# Patient Record
Sex: MALE | Race: OTHER | HISPANIC OR LATINO | URBAN - METROPOLITAN AREA
[De-identification: names, ages, dates, MRNs, and addresses within clinical notes are randomized per-mention and may not be internally consistent; named-entity substitution may affect disease eponyms.]

---

## 2020-03-27 ENCOUNTER — EMERGENCY (EMERGENCY)
Facility: HOSPITAL | Age: 55
LOS: 1 days | Discharge: HOME | End: 2020-03-27
Attending: EMERGENCY MEDICINE
Payer: MEDICAID

## 2020-03-27 VITALS
TEMPERATURE: 102 F | OXYGEN SATURATION: 96 % | HEIGHT: 63 IN | RESPIRATION RATE: 19 BRPM | HEART RATE: 106 BPM | SYSTOLIC BLOOD PRESSURE: 129 MMHG | WEIGHT: 147.05 LBS | DIASTOLIC BLOOD PRESSURE: 75 MMHG

## 2020-03-27 VITALS
RESPIRATION RATE: 15 BRPM | TEMPERATURE: 98 F | OXYGEN SATURATION: 100 % | SYSTOLIC BLOOD PRESSURE: 110 MMHG | DIASTOLIC BLOOD PRESSURE: 68 MMHG | HEART RATE: 88 BPM

## 2020-03-27 PROCEDURE — 87635 SARS-COV-2 COVID-19 AMP PRB: CPT

## 2020-03-27 PROCEDURE — 99283 EMERGENCY DEPT VISIT LOW MDM: CPT

## 2020-03-27 PROCEDURE — 93005 ELECTROCARDIOGRAM TRACING: CPT

## 2020-03-27 PROCEDURE — 94640 AIRWAY INHALATION TREATMENT: CPT

## 2020-03-27 RX ORDER — ACETAMINOPHEN 500 MG
975 TABLET ORAL ONCE
Refills: 0 | Status: COMPLETED | OUTPATIENT
Start: 2020-03-27 | End: 2020-03-27

## 2020-03-27 RX ORDER — MECLIZINE HCL 12.5 MG
25 TABLET ORAL ONCE
Refills: 0 | Status: COMPLETED | OUTPATIENT
Start: 2020-03-27 | End: 2020-03-27

## 2020-03-27 RX ORDER — MECLIZINE HCL 12.5 MG
1 TABLET ORAL
Qty: 20 | Refills: 0
Start: 2020-03-27

## 2020-03-27 RX ORDER — ALBUTEROL 90 UG/1
4 AEROSOL, METERED ORAL ONCE
Refills: 0 | Status: COMPLETED | OUTPATIENT
Start: 2020-03-27 | End: 2020-03-27

## 2020-03-27 RX ADMIN — ALBUTEROL 4 PUFF(S): 90 AEROSOL, METERED ORAL at 22:13

## 2020-03-27 RX ADMIN — Medication 975 MILLIGRAM(S): at 22:13

## 2020-03-27 RX ADMIN — Medication 25 MILLIGRAM(S): at 22:21

## 2020-03-27 NOTE — ED PROVIDER NOTE - PHYSICAL EXAMINATION
CONSTITUTIONAL: Well appearing, awake, alert, oriented to person, place, time/situation and in no apparent distress. EYES: Clear bilaterally, pupils equal, round and reactive to light. CARDIAC: Normal rate, regular rhythm.  Heart sounds S1, S2. RESPIRATORY: Tachypneic at RR 28 rpm. Breath sounds clear and equal bilaterally. O2 sat 96% RA after ambulation. No signs of distress. GASTROINTESTINAL: Abdomen soft, non-tender, no guarding. NEUROLOGICAL: Alert and oriented, no focal deficits, grossly intact. SKIN: Skin normal color for race, warm, dry and intact. No evidence of rash.

## 2020-03-27 NOTE — ED PROVIDER NOTE - NSFOLLOWUPINSTRUCTIONS_ED_ALL_ED_FT
Hoy puede o no carmencita sido examinado para detectar el virus COVID-19. Tendrá que aislarse aurora los próximos _14___ días y luego puede salir del aislamiento siempre y cuando tenga 3 días sin fiebre (sin brittany ningún medicamento para la fiebre).    Para el dolor o la fiebre, puede brittany: Tylenol 1000 mg por vía oral cada 6 horas, según sea necesario. (Chase 4000 mg en 24 horas).    Mantente sharri hidratado bebiendo mucha agua todos los días.    ** Regrese a la julia de urgencias si comienza a tener dificultad para respirar, si nghia síntomas empeoran o si le preocupa. De lo contrario, quédese en casa y aísle.    ** Si tiene amigos o familiares que tienen síntomas leves que pueden deberse al virus, dígales que se queden en casa    Quédese en casa: las personas que están levemente enfermas con COVID-19 pueden recuperarse en casa. No se vaya, excepto para obtener atención médica. No visitar áreas públicas.  Manténgase en contacto con blair médico. Llame antes de recibir atención médica. Asegúrese de recibir atención si se siente peor o si eric que es rd emergencia.  Evite el transporte público: evite usar el transporte público, el transporte compartido o los taxis.  Manténgase alejado de los demás: tanto jam sea posible, debe permanecer en rd "habitación para enfermos" específica y lejos de otras personas en blair hogar. Use un baño separado, si está disponible.  Limite el contacto con mascotas y animales: debe restringir el contacto con mascotas y otros animales, martina jam lo haría con otras personas.  Aunque no kapoor habido informes de mascotas u otros animales que se enfermen con COVID-19, aún se recomienda que las personas con el virus limiten el contacto con los animales hasta que se conozca más información.  Si está enfermo: debe usar rd mascarilla cuando esté cerca de otras personas y antes de ingresar al consultorio de un proveedor de atención médica.  Si está cuidando a otros: si la persona enferma no puede usar rd máscara facial (por ejemplo, porque causa problemas para respirar), las personas que viven en el hogar deben permanecer en rd habitación diferente. Cuando los cuidadores ingresan a la habitación de la persona enferma, deben usar rd máscara facial. No se recomiendan visitantes, aparte de los cuidadores.  Cubierta: Cubra blair boca y nariz con un pañuelo cuando tosa o estornude.  Eliminar: tirar los pañuelos usados ??en un bote de basura forrado.  Lávese las radhika: Lávese las radhika inmediatamente con agua y jabón aurora al menos 20 segundos. Si no hay agua y jabón disponibles, lávese las radhika con un desinfectante para radhika a base de alcohol que contenga al menos 60% de alcohol.  Desinfectante para radhika: si no hay agua y jabón disponibles, use un desinfectante para radhika a base de alcohol con al menos 60% de alcohol, cubriendo todas las superficies de nghia radhika y frotándolas hasta que se sientan secas.  Jabón y agua: el jabón y el agua son la mejor opción, especialmente si las radhika están visiblemente sucias.  Evite tocar: evite tocarse los ojos, la nariz y la boca con las radhika sin sharla.  No comparta: No comparta platos, vasos, vasos, utensilios para comer, toallas o ropa de cama con otras personas en blair hogar.  Lávese sharri después del uso: después de usar estos artículos, lávelos sharri con agua y jabón o póngalos en el lavavajias.  Limpie y desinfecte: Rutinariamente limpie las superficies de alto contacto en blair "habitación para enfermos" y baño. Deje que otra persona limpie y desinfecte las superficies en las áreas comunes, aníbal no en blair habitación y baño.  Si un cuidador u otra persona necesita limpiar y desinfectar la habitación o el baño de rd persona enferma, debe hacerlo según sea necesario. El cuidador / otra persona debe usar rd máscara y esperar el mayor tiempo posible después de que la persona enferma haya usado el baño.  Las superficies de alto contacto incluyen teléfonos, controles remotos, mostradores, mesas, pomos de elizabeth, accesorios de baño, inodoros, teclados, tabletas y mesitas de noche.  Limpie y desinfecte las áreas que pueden tener chintan, heces o fluidos corporales.  Busque atención médica, aníbal llame gaby: busque atención médica de inmediato si blair enfermedad está empeorando (por ejemplo, si tiene dificultad para respirar).  Llame a blair médico antes de ingresar: Antes de ir al consultorio del médico o la julia de emergencias, llame con anticipación y dígales nghia síntomas. Te dirán qué hacer.  Use rd máscara facial: si es posible, póngase rd máscara facial antes de ingresar al edificio. Si no puede ponerse rd máscara facial, trate de mantener rd distancia schwartz de otras personas (al menos a 6 pies de distancia). [place] ayudará a proteger a las personas en la oficina o en la julia de espera.  Siga las instrucciones de cuidado de blair proveedor de atención médica y el departamento de [place] local: las autoridades de holl[place] locales le darán instrucciones sobre cómo controlar nghia síntomas y reportar información.  Llame al 911 si tiene rd emergencia médica: si tiene rd emergencia médica y necesita llamar al 911, notifique al operador que tiene o eric que podría tener COVID-19. Si es posible, póngase rd mascarilla antes de que llegue la ayuda médica.

## 2020-03-27 NOTE — ED PROVIDER NOTE - OBJECTIVE STATEMENT
used: 55 year-old male, history of hernia, presents with cc flu-like symptoms for 8 days. Generalized body aches, fever (max 102 F), SOB, sore throat. Today got concerned because SOB getting worse. Denies any chest pain, abdominal pain, N/V/D, urinary symptoms, rash, photophobia, neck stiffness or any other complaints.

## 2020-03-27 NOTE — ED PROVIDER NOTE - ATTENDING CONTRIBUTION TO CARE
Patient with shortness of breath for several days. on exam, sat 98% after ambulation, EKG NSR. also notes getting pulled to the side when walking. improved with meclizine. home with rx. instructed to return if worsening shortness of breath. On exam clear lungs regular heart sounds, ambulatory with steady gait, normal neuro exam.

## 2020-03-27 NOTE — ED PROVIDER NOTE - CLINICAL SUMMARY MEDICAL DECISION MAKING FREE TEXT BOX
Patient with sensation of shortness of breath cough and body aches. EKG and vitals normal. home with meclizine.

## 2020-03-27 NOTE — ED PROVIDER NOTE - PATIENT PORTAL LINK FT
You can access the FollowMyHealth Patient Portal offered by [facility] by registering at the following website: [email or web address]. By joining [employer]’s FollowMyHealth portal, you will also be able to view your health information using other applications (apps) compatible with our system.

## 2020-03-28 LAB — SARS-COV-2 RNA SPEC QL NAA+PROBE: SIGNIFICANT CHANGE UP
